# Patient Record
Sex: FEMALE | Race: WHITE
[De-identification: names, ages, dates, MRNs, and addresses within clinical notes are randomized per-mention and may not be internally consistent; named-entity substitution may affect disease eponyms.]

---

## 2017-10-20 ENCOUNTER — HOSPITAL ENCOUNTER (OUTPATIENT)
Dept: HOSPITAL 92 - DTY/OP | Age: 52
Discharge: HOME | End: 2017-10-20
Attending: SURGERY
Payer: COMMERCIAL

## 2017-10-20 ENCOUNTER — HOSPITAL ENCOUNTER (OUTPATIENT)
Dept: HOSPITAL 92 - RAD | Age: 52
Discharge: HOME | End: 2017-10-20
Attending: SURGERY
Payer: COMMERCIAL

## 2017-10-20 DIAGNOSIS — R19.2: ICD-10-CM

## 2017-10-20 DIAGNOSIS — E66.01: Primary | ICD-10-CM

## 2017-10-20 DIAGNOSIS — K31.89: ICD-10-CM

## 2017-10-20 DIAGNOSIS — K21.9: Primary | ICD-10-CM

## 2017-10-20 DIAGNOSIS — K44.9: ICD-10-CM

## 2017-10-20 DIAGNOSIS — E66.01: ICD-10-CM

## 2017-10-20 PROCEDURE — 97802 MEDICAL NUTRITION INDIV IN: CPT

## 2017-10-20 PROCEDURE — 74220 X-RAY XM ESOPHAGUS 1CNTRST: CPT

## 2017-10-20 NOTE — RAD
BARIUM SWALLOW ESOPHAGRAM:

 

HISTORY: 

Reflux.  K21.9.

 

COMPARISON: 

None.

 

TECHNIQUE: 

The patient was brought to the fluoroscopy suite.  All questions were answered.

 

Initially, the patient was given half a dose of gas-forming crystals.  The patient tolerated this we
ll.

 

Subsequently, thick liquid barium was given.  Primary and secondary peristalsis was performed.  Ther
e is reflux of the mid 1/3 esophagus.  Small sliding hiatal hernia.

 

Contrast transited through the GE junction without complication.

 

No diverticulum or extensive mass effect.

 

Next, the patient was put in the PANDA position.  Again, primary and secondary peristalsis was poor.  
The stomach was flipped up with organoaxial volvulus.

 

No evidence of obstruction.  The proximal small bowel appeared normal.

 

IMPRESSION: 

1.  Small sliding hiatal hernia with reflux to the mid 1/3 esophagus.

2.  Primary and secondary peristalsis, likely sequelae of primary reflux disease.

3.  13 mm barium tablet passed through the gastroesophageal junction without complication.

4.  Organoaxial volvulus of the stomach.

 

POS: [facility]

## 2017-12-04 ENCOUNTER — HOSPITAL ENCOUNTER (OUTPATIENT)
Dept: HOSPITAL 92 - LABBT | Age: 52
Discharge: HOME | End: 2017-12-04
Attending: SURGERY
Payer: COMMERCIAL

## 2017-12-04 DIAGNOSIS — E66.01: ICD-10-CM

## 2017-12-04 DIAGNOSIS — Z01.818: Primary | ICD-10-CM

## 2017-12-04 LAB
ALP SERPL-CCNC: 80 U/L (ref 40–150)
ALT SERPL W P-5'-P-CCNC: 16 U/L (ref 8–55)
ANION GAP SERPL CALC-SCNC: 11 MMOL/L (ref 10–20)
AST SERPL-CCNC: 23 U/L (ref 5–34)
BILIRUB DIRECT SERPL-MCNC: 0.3 MG/DL (ref 0.1–0.3)
BILIRUB SERPL-MCNC: 0.7 MG/DL (ref 0.2–1.2)
BUN SERPL-MCNC: 11 MG/DL (ref 9.8–20.1)
CALCIUM SERPL-MCNC: 9.4 MG/DL (ref 7.8–10.44)
CHLORIDE SERPL-SCNC: 105 MMOL/L (ref 98–107)
CO2 SERPL-SCNC: 26 MMOL/L (ref 22–29)
CREAT CL PREDICTED SERPL C-G-VRATE: 0 ML/MIN (ref 70–130)
GLOBULIN SER CALC-MCNC: 2.7 G/DL (ref 2.4–3.5)
HCT VFR BLD CALC: 38.8 % (ref 36–47)
RBC # BLD AUTO: 4.04 MILL/UL (ref 4.2–5.4)
WBC # BLD AUTO: 5.4 THOU/UL (ref 4.8–10.8)

## 2017-12-04 PROCEDURE — 93010 ELECTROCARDIOGRAM REPORT: CPT

## 2017-12-04 PROCEDURE — 71020: CPT

## 2017-12-04 PROCEDURE — 83036 HEMOGLOBIN GLYCOSYLATED A1C: CPT

## 2017-12-04 PROCEDURE — 80076 HEPATIC FUNCTION PANEL: CPT

## 2017-12-04 PROCEDURE — 93005 ELECTROCARDIOGRAM TRACING: CPT

## 2017-12-04 PROCEDURE — 85025 COMPLETE CBC W/AUTO DIFF WBC: CPT

## 2017-12-04 PROCEDURE — 80053 COMPREHEN METABOLIC PANEL: CPT

## 2017-12-04 NOTE — RAD
CHEST TWO VIEWS:

 

History: Pre-operative exam. 

 

Comparison: None. 

 

FINDINGS: 

Normal cardiac silhouette. The pulmonary vessels and hilum are normal. No masses or consolidation. No
 pneumothorax or osseous abnormalities. 

 

IMPRESSION: 

No acute cardiopulmonary process. 

 

POS: [facility]

## 2017-12-05 NOTE — EKG
Test Reason : 

Blood Pressure : ***/*** mmHG

Vent. Rate : 056 BPM     Atrial Rate : 056 BPM

   P-R Int : 146 ms          QRS Dur : 078 ms

    QT Int : 418 ms       P-R-T Axes : 004 013 041 degrees

   QTc Int : 403 ms

 

Sinus bradycardia

Otherwise normal ECG

No previous ECGs available

Confirmed by RAUL WEST ([id number]) on 12/5/2017 1:13:30 PM

 

Referred By:  ALAN           Confirmed By:RAUL WEST

## 2017-12-07 ENCOUNTER — HOSPITAL ENCOUNTER (INPATIENT)
Dept: HOSPITAL 92 - SDC | Age: 52
LOS: 1 days | Discharge: HOME | DRG: 909 | End: 2017-12-08
Attending: SURGERY | Admitting: SURGERY
Payer: COMMERCIAL

## 2017-12-07 VITALS — BODY MASS INDEX: 33.5 KG/M2

## 2017-12-07 DIAGNOSIS — R13.12: ICD-10-CM

## 2017-12-07 DIAGNOSIS — T85.628A: Primary | ICD-10-CM

## 2017-12-07 DIAGNOSIS — I10: ICD-10-CM

## 2017-12-07 DIAGNOSIS — K31.89: ICD-10-CM

## 2017-12-07 DIAGNOSIS — E66.01: ICD-10-CM

## 2017-12-07 PROCEDURE — 94760 N-INVAS EAR/PLS OXIMETRY 1: CPT

## 2017-12-07 PROCEDURE — 0DB64Z3 EXCISION OF STOMACH, PERCUTANEOUS ENDOSCOPIC APPROACH, VERTICAL: ICD-10-PCS | Performed by: SURGERY

## 2017-12-07 PROCEDURE — 88312 SPECIAL STAINS GROUP 1: CPT

## 2017-12-07 PROCEDURE — C9113 INJ PANTOPRAZOLE SODIUM, VIA: HCPCS

## 2017-12-07 PROCEDURE — 88307 TISSUE EXAM BY PATHOLOGIST: CPT

## 2017-12-07 PROCEDURE — 0DJ08ZZ INSPECTION OF UPPER INTESTINAL TRACT, VIA NATURAL OR ARTIFICIAL OPENING ENDOSCOPIC: ICD-10-PCS | Performed by: SURGERY

## 2017-12-07 PROCEDURE — 0DP64CZ REMOVAL OF EXTRALUMINAL DEVICE FROM STOMACH, PERCUTANEOUS ENDOSCOPIC APPROACH: ICD-10-PCS | Performed by: SURGERY

## 2017-12-07 PROCEDURE — 36415 COLL VENOUS BLD VENIPUNCTURE: CPT

## 2017-12-07 PROCEDURE — 85025 COMPLETE CBC W/AUTO DIFF WBC: CPT

## 2017-12-07 PROCEDURE — 74241: CPT

## 2017-12-07 PROCEDURE — 80048 BASIC METABOLIC PNL TOTAL CA: CPT

## 2017-12-07 RX ADMIN — POTASSIUM CHLORIDE, DEXTROSE MONOHYDRATE AND SODIUM CHLORIDE SCH MLS: 150; 5; 450 INJECTION, SOLUTION INTRAVENOUS at 20:24

## 2017-12-07 RX ADMIN — POTASSIUM CHLORIDE, DEXTROSE MONOHYDRATE AND SODIUM CHLORIDE SCH MLS: 150; 5; 450 INJECTION, SOLUTION INTRAVENOUS at 14:52

## 2017-12-07 RX ADMIN — Medication SCH GM: at 20:27

## 2017-12-07 NOTE — OP
PREOPERATIVE DIAGNOSIS:  Gastric torsion with slipped lap band.

 

SURGEON:  Drew Chan M.D.

 

PROCEDURE:  Laparoscopic removal of lap band and port with sleeve gastrectomy.

 

INDICATIONS:  This is a 52-year-old female who had a lap band that started having severe dysphagia de
spite an empty band swallow showed what appeared to be gastric torsion.

 

FINDINGS:  The fundus was torsed, but was able to be untorsed.  A 38-Danish bougie was used.

 

DESCRIPTION OF PROCEDURE:  After informed consent was obtained, the patient was taken to the operatin
g room and given general endotracheal anesthesia, placed in the supine position.  The abdomen was pre
pped and draped in the usual fashion.  Local anesthesia infiltrated subcutaneously and deep.  A 12 mm
 incision was performed approximately 8 inches below the xiphoid slightly to the left.  Veress needle
 inserted.  Drop test performed.  Pneumoperitoneum was created to a volume of 2 liters of carbon diox
vicente.  Utilizing a bladeless 12 mm trocar and 0 degree laparoscope, direct visual entry in the abdomin
al cavity was performed.  Pneumoperitoneum was then created to a pressure of 15 mmHg.  The patient wa
s placed in steep reverse Trendelenburg position.  Nathansen liver retractor inserted.  Left lobe of 
liver retracted superiorly.  Pylorus identified a 12 mm port placed on the right beneath it and two 1
2s placed left subcostal, one of which was where the port of the lap band was.  The lap band tubing w
as dissected out and then divided just band side of the connecting clip.  Then, this was traced back 
to the band and the band was dissected out sharply with Metzenbaum scissors.  The capsule was incised
 with the LigaSure circumferentially.  The buckle was unbuckled and the band taken from around the st
ach.  It was removed from the left upper quadrant incisions.  Then, the omentum was taken off the g
reater curvature utilizing the LigaSure 5 cm from the pylorus.  The short gastrics divided with LigaS
ure and the left crura defined with the LigaSure.  A 38-Danish bougie then inserted and directed into
 the antrum.  The linear 60 mm green load stapler used to divide the antrum to the bougie, gold load 
along the bougie, and a series of blues through the angle of His.  Intraoperative endoscopy was perfo
rmed.  The video endoscope inserted under direct vision and advanced into the sleeve.  The staple esme
e inspected.  There was no bleeding.  Staple line then tested by inflating the new stomach with press
urized air under water.  There was no air leak.  Stomach decompressed.  Scope removed.  The remnant s
tomach removed from the abdomen through the left upper port site.  The lap band port was dissected ou
t and removed.  The fascia closed with 0 Vicryl suture and the GraNee needle.  Trocars and retractors
 removed.  The skin closed with interrupted 4-0 Rapide.  Dermabond applied.  The patient tolerated th
e procedure well and transferred to recovery in good condition.  Sponge and needle count verified cor
rect x2.

## 2017-12-08 VITALS — TEMPERATURE: 98.4 F | DIASTOLIC BLOOD PRESSURE: 71 MMHG | SYSTOLIC BLOOD PRESSURE: 109 MMHG

## 2017-12-08 LAB
ANION GAP SERPL CALC-SCNC: 5 MMOL/L (ref 10–20)
BASOPHILS # BLD AUTO: 0 THOU/UL (ref 0–0.2)
BASOPHILS NFR BLD AUTO: 0.1 % (ref 0–1)
BUN SERPL-MCNC: 10 MG/DL (ref 9.8–20.1)
CALCIUM SERPL-MCNC: 8.8 MG/DL (ref 7.8–10.44)
CHLORIDE SERPL-SCNC: 108 MMOL/L (ref 98–107)
CO2 SERPL-SCNC: 28 MMOL/L (ref 22–29)
CREAT CL PREDICTED SERPL C-G-VRATE: 124 ML/MIN (ref 70–130)
EOSINOPHIL # BLD AUTO: 0 THOU/UL (ref 0–0.7)
EOSINOPHIL NFR BLD AUTO: 0.1 % (ref 0–10)
HCT VFR BLD CALC: 35 % (ref 36–47)
LYMPHOCYTES # BLD: 1.1 THOU/UL (ref 1.2–3.4)
LYMPHOCYTES NFR BLD AUTO: 15.9 % (ref 21–51)
MONOCYTES # BLD AUTO: 0.5 THOU/UL (ref 0.11–0.59)
MONOCYTES NFR BLD AUTO: 7 % (ref 0–10)
NEUTROPHILS # BLD AUTO: 5.1 THOU/UL (ref 1.4–6.5)
RBC # BLD AUTO: 3.62 MILL/UL (ref 4.2–5.4)
WBC # BLD AUTO: 6.6 THOU/UL (ref 4.8–10.8)

## 2017-12-08 RX ADMIN — POTASSIUM CHLORIDE, DEXTROSE MONOHYDRATE AND SODIUM CHLORIDE SCH: 150; 5; 450 INJECTION, SOLUTION INTRAVENOUS at 04:18

## 2017-12-08 RX ADMIN — Medication SCH GM: at 04:57

## 2017-12-08 RX ADMIN — POTASSIUM CHLORIDE, DEXTROSE MONOHYDRATE AND SODIUM CHLORIDE SCH: 150; 5; 450 INJECTION, SOLUTION INTRAVENOUS at 12:39

## 2017-12-08 NOTE — RAD
SINGLE CONTRAST UPPER GI:

 

Date:  12/08/17 

 

HISTORY:  

52-year-old female status post bariatric surgery evaluation. 

 

FLUOROSCOP TIME:

0.3 minutes. 

 

DOSE:

7.071 Gy*cm^2. 

 

TECHNIQUE/FINDINGS:

The patient was given 15 mL of Gastrografin orally in the upright position. Contrast media progressed
 through the esophagus and postoperative stomach. 

 

IMPRESSION: 

Unremarkable 15 mL Gastrografin swallow. 

 

 

POS: [facility]

## 2017-12-08 NOTE — DIS
DISCHARGE DIAGNOSIS:  Gastric volvulus, gastric outlet obstruction.

 

PROCEDURES DURING ADMISSION:  Laparoscopic removal of band and port with conversion to sleeve gastrec
chaz, EGD, postoperative Gastrografin swallow.

 

HOSPITAL COURSE:  The patient was admitted and taken to the operating room where she underwent detors
ion of her stomach with removal of band and port and sleeve gastrectomy with intraoperative esophagog
astroscopy.  Postoperatively, she did well.  Swallow was fine.  She was started on liquids.  She is t
olerating well.  Her pain is controlled on p.o. medications.  She is discharged home on hydrocodone a
nd Zofran.  She will follow up with me in 2 weeks.

## 2018-01-04 ENCOUNTER — HOSPITAL ENCOUNTER (EMERGENCY)
Dept: HOSPITAL 92 - ERS | Age: 53
Discharge: HOME | End: 2018-01-04
Payer: COMMERCIAL

## 2018-01-04 DIAGNOSIS — I10: ICD-10-CM

## 2018-01-04 DIAGNOSIS — K80.20: Primary | ICD-10-CM

## 2018-01-04 LAB
ALBUMIN SERPL BCG-MCNC: 4.1 G/DL (ref 3.5–5)
ALP SERPL-CCNC: 159 U/L (ref 40–150)
ALT SERPL W P-5'-P-CCNC: 87 U/L (ref 8–55)
ANION GAP SERPL CALC-SCNC: 14 MMOL/L (ref 10–20)
AST SERPL-CCNC: 92 U/L (ref 5–34)
BASOPHILS # BLD AUTO: 0.1 THOU/UL (ref 0–0.2)
BASOPHILS NFR BLD AUTO: 1.2 % (ref 0–1)
BILIRUB SERPL-MCNC: 0.6 MG/DL (ref 0.2–1.2)
BUN SERPL-MCNC: 9 MG/DL (ref 9.8–20.1)
CALCIUM SERPL-MCNC: 10.5 MG/DL (ref 7.8–10.44)
CHLORIDE SERPL-SCNC: 107 MMOL/L (ref 98–107)
CO2 SERPL-SCNC: 25 MMOL/L (ref 22–29)
CREAT CL PREDICTED SERPL C-G-VRATE: 0 ML/MIN (ref 70–130)
EOSINOPHIL # BLD AUTO: 0.2 THOU/UL (ref 0–0.7)
EOSINOPHIL NFR BLD AUTO: 3.4 % (ref 0–10)
GLOBULIN SER CALC-MCNC: 3 G/DL (ref 2.4–3.5)
GLUCOSE SERPL-MCNC: 89 MG/DL (ref 70–105)
HGB BLD-MCNC: 13.6 G/DL (ref 12–16)
LYMPHOCYTES # BLD: 2.2 THOU/UL (ref 1.2–3.4)
LYMPHOCYTES NFR BLD AUTO: 43.8 % (ref 21–51)
MCH RBC QN AUTO: 31.8 PG (ref 27–31)
MCV RBC AUTO: 96.9 FL (ref 81–99)
MONOCYTES # BLD AUTO: 0.3 THOU/UL (ref 0.11–0.59)
MONOCYTES NFR BLD AUTO: 6.7 % (ref 0–10)
NEUTROPHILS # BLD AUTO: 2.2 THOU/UL (ref 1.4–6.5)
NEUTROPHILS NFR BLD AUTO: 44.9 % (ref 42–75)
PLATELET # BLD AUTO: 165 THOU/UL (ref 130–400)
POTASSIUM SERPL-SCNC: 4.2 MMOL/L (ref 3.5–5.1)
RBC # BLD AUTO: 4.27 MILL/UL (ref 4.2–5.4)
SODIUM SERPL-SCNC: 142 MMOL/L (ref 136–145)
SP GR UR STRIP: 1.01 (ref 1–1.04)
WBC # BLD AUTO: 4.9 THOU/UL (ref 4.8–10.8)

## 2018-01-04 PROCEDURE — 80053 COMPREHEN METABOLIC PANEL: CPT

## 2018-01-04 PROCEDURE — 76705 ECHO EXAM OF ABDOMEN: CPT

## 2018-01-04 PROCEDURE — 83605 ASSAY OF LACTIC ACID: CPT

## 2018-01-04 PROCEDURE — 96361 HYDRATE IV INFUSION ADD-ON: CPT

## 2018-01-04 PROCEDURE — 36415 COLL VENOUS BLD VENIPUNCTURE: CPT

## 2018-01-04 PROCEDURE — 96375 TX/PRO/DX INJ NEW DRUG ADDON: CPT

## 2018-01-04 PROCEDURE — 85025 COMPLETE CBC W/AUTO DIFF WBC: CPT

## 2018-01-04 PROCEDURE — 74177 CT ABD & PELVIS W/CONTRAST: CPT

## 2018-01-04 PROCEDURE — 81003 URINALYSIS AUTO W/O SCOPE: CPT

## 2018-01-04 PROCEDURE — 96376 TX/PRO/DX INJ SAME DRUG ADON: CPT

## 2018-01-04 PROCEDURE — 96374 THER/PROPH/DIAG INJ IV PUSH: CPT

## 2018-02-20 NOTE — ULT
GALLBLADDER ULTRASOUND:

1/4/18

 

HISTORY: 

Abdominal pain. Left upper quadrant to back pain. Nausea and vomiting. 

 

COMPARISON:  

None.

 

TECHNIQUE:  

Utilizing multihertz transducer, sonographic imaging of the right upper quadrant is performed in the 
longitudinal and transverse plane. 

 

FINDINGS:  

The pancreas is obscured by bowel gas. Hepatic parenchyma has a normal echotexture. No hepatic masses
 or intrahepatic biliary dilatation. The contour of the hepatic margin is maintained. Right hepatic l
obe measures 13.7 cm. 

 

Gallbladder appears to be contacted. Questionable cholelithiasis without definite gallbladder wall th
ickening or pericholecystic fluid. Negative Brennan's sign. Common bile duct diameter is difficult to 
appreciate. 

 

Main portal vein is patent. Appropriate directional flow.

 

There is right renal cortical thinning. No hydronephrosis. Right kidney measures 4.7 x 9.5 x 4.3 cm. 


 

IMPRESSION:  

Limited evaluation due to bowel gas. Contracted gallbladder. There is sonographic evidence of choleli
thiasis without definite sonographic evidence of cholecystitis. 

 

 

 

POS: [facility]

## 2020-08-27 ENCOUNTER — HOSPITAL ENCOUNTER (OUTPATIENT)
Dept: HOSPITAL 92 - LABBT | Age: 55
Discharge: HOME | End: 2020-08-27
Attending: SURGERY
Payer: COMMERCIAL

## 2020-08-27 DIAGNOSIS — Z20.828: ICD-10-CM

## 2020-08-27 DIAGNOSIS — Z01.818: Primary | ICD-10-CM

## 2020-08-27 DIAGNOSIS — K80.20: ICD-10-CM

## 2020-08-27 LAB
ALBUMIN SERPL BCG-MCNC: 4.1 G/DL (ref 3.5–5)
ALP SERPL-CCNC: 79 U/L (ref 40–110)
ALT SERPL W P-5'-P-CCNC: 8 U/L (ref 8–55)
ANION GAP SERPL CALC-SCNC: 9 MMOL/L (ref 10–20)
AST SERPL-CCNC: 18 U/L (ref 5–34)
BASOPHILS # BLD AUTO: 0.1 THOU/UL (ref 0–0.2)
BASOPHILS NFR BLD AUTO: 1.2 % (ref 0–1)
BILIRUB DIRECT SERPL-MCNC: 0.2 MG/DL (ref 0.1–0.3)
BILIRUB SERPL-MCNC: 0.4 MG/DL (ref 0.2–1.2)
BUN SERPL-MCNC: 11 MG/DL (ref 9.8–20.1)
CALCIUM SERPL-MCNC: 9 MG/DL (ref 7.8–10.44)
CHLORIDE SERPL-SCNC: 108 MMOL/L (ref 98–107)
CO2 SERPL-SCNC: 29 MMOL/L (ref 22–29)
CREAT CL PREDICTED SERPL C-G-VRATE: 0 ML/MIN (ref 70–130)
EOSINOPHIL # BLD AUTO: 0.1 THOU/UL (ref 0–0.7)
EOSINOPHIL NFR BLD AUTO: 2.2 % (ref 0–10)
GLUCOSE SERPL-MCNC: 81 MG/DL (ref 70–105)
HGB BLD-MCNC: 12.3 G/DL (ref 12–16)
LYMPHOCYTES # BLD: 2.2 THOU/UL (ref 1.2–3.4)
LYMPHOCYTES NFR BLD AUTO: 43.3 % (ref 21–51)
MCH RBC QN AUTO: 30.6 PG (ref 27–31)
MCV RBC AUTO: 95.1 FL (ref 78–98)
MONOCYTES # BLD AUTO: 0.4 THOU/UL (ref 0.11–0.59)
MONOCYTES NFR BLD AUTO: 7.5 % (ref 0–10)
NEUTROPHILS # BLD AUTO: 2.3 THOU/UL (ref 1.4–6.5)
NEUTROPHILS NFR BLD AUTO: 45.9 % (ref 42–75)
PLATELET # BLD AUTO: 176 THOU/UL (ref 130–400)
POTASSIUM SERPL-SCNC: 4.3 MMOL/L (ref 3.5–5.1)
RBC # BLD AUTO: 4.03 MILL/UL (ref 4.2–5.4)
SODIUM SERPL-SCNC: 142 MMOL/L (ref 136–145)
WBC # BLD AUTO: 5 THOU/UL (ref 4.8–10.8)

## 2020-08-27 PROCEDURE — 80076 HEPATIC FUNCTION PANEL: CPT

## 2020-08-27 PROCEDURE — 80048 BASIC METABOLIC PNL TOTAL CA: CPT

## 2020-08-27 PROCEDURE — 93010 ELECTROCARDIOGRAM REPORT: CPT

## 2020-08-27 PROCEDURE — U0003 INFECTIOUS AGENT DETECTION BY NUCLEIC ACID (DNA OR RNA); SEVERE ACUTE RESPIRATORY SYNDROME CORONAVIRUS 2 (SARS-COV-2) (CORONAVIRUS DISEASE [COVID-19]), AMPLIFIED PROBE TECHNIQUE, MAKING USE OF HIGH THROUGHPUT TECHNOLOGIES AS DESCRIBED BY CMS-2020-01-R: HCPCS

## 2020-08-27 PROCEDURE — 87635 SARS-COV-2 COVID-19 AMP PRB: CPT

## 2020-08-27 PROCEDURE — 85025 COMPLETE CBC W/AUTO DIFF WBC: CPT

## 2020-08-27 PROCEDURE — 93005 ELECTROCARDIOGRAM TRACING: CPT

## 2020-08-31 ENCOUNTER — HOSPITAL ENCOUNTER (INPATIENT)
Dept: HOSPITAL 92 - SDC | Age: 55
LOS: 2 days | Discharge: HOME | DRG: 418 | End: 2020-09-02
Attending: SURGERY | Admitting: SURGERY
Payer: COMMERCIAL

## 2020-08-31 VITALS — BODY MASS INDEX: 27.4 KG/M2

## 2020-08-31 DIAGNOSIS — K83.8: ICD-10-CM

## 2020-08-31 DIAGNOSIS — R93.2: ICD-10-CM

## 2020-08-31 DIAGNOSIS — R01.1: ICD-10-CM

## 2020-08-31 DIAGNOSIS — E87.5: ICD-10-CM
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
## 2020-08-31 DIAGNOSIS — Z98.51: ICD-10-CM

## 2020-08-31 DIAGNOSIS — K80.70: Primary | ICD-10-CM

## 2020-08-31 DIAGNOSIS — I10: ICD-10-CM

## 2020-08-31 DIAGNOSIS — E66.9: ICD-10-CM

## 2020-08-31 DIAGNOSIS — I97.191: ICD-10-CM

## 2020-08-31 DIAGNOSIS — R74.0: ICD-10-CM

## 2020-08-31 DIAGNOSIS — Z98.84: ICD-10-CM

## 2020-08-31 DIAGNOSIS — D64.9: ICD-10-CM

## 2020-08-31 LAB
ALBUMIN SERPL BCG-MCNC: 3.9 G/DL (ref 3.5–5)
ALP SERPL-CCNC: 97 U/L (ref 40–110)
ALT SERPL W P-5'-P-CCNC: 112 U/L (ref 8–55)
ANION GAP SERPL CALC-SCNC: 11 MMOL/L (ref 10–20)
AST SERPL-CCNC: 232 U/L (ref 5–34)
BASOPHILS # BLD AUTO: 0.1 THOU/UL (ref 0–0.2)
BASOPHILS NFR BLD AUTO: 1.1 % (ref 0–1)
BILIRUB SERPL-MCNC: 1 MG/DL (ref 0.2–1.2)
BUN SERPL-MCNC: 11 MG/DL (ref 9.8–20.1)
CALCIUM SERPL-MCNC: 9 MG/DL (ref 7.8–10.44)
CHLORIDE SERPL-SCNC: 105 MMOL/L (ref 98–107)
CO2 SERPL-SCNC: 26 MMOL/L (ref 22–29)
CREAT CL PREDICTED SERPL C-G-VRATE: 90 ML/MIN (ref 70–130)
EOSINOPHIL # BLD AUTO: 0 THOU/UL (ref 0–0.7)
EOSINOPHIL NFR BLD AUTO: 0.1 % (ref 0–10)
GLOBULIN SER CALC-MCNC: 2.5 G/DL (ref 2.4–3.5)
GLUCOSE SERPL-MCNC: 171 MG/DL (ref 70–105)
HGB BLD-MCNC: 12.9 G/DL (ref 12–16)
LYMPHOCYTES # BLD: 0.4 THOU/UL (ref 1.2–3.4)
LYMPHOCYTES NFR BLD AUTO: 5 % (ref 21–51)
MAGNESIUM SERPL-MCNC: 1.9 MG/DL (ref 1.6–2.6)
MCH RBC QN AUTO: 31.4 PG (ref 27–31)
MCV RBC AUTO: 94.4 FL (ref 78–98)
MONOCYTES # BLD AUTO: 0.1 THOU/UL (ref 0.11–0.59)
MONOCYTES NFR BLD AUTO: 1.4 % (ref 0–10)
NEUTROPHILS # BLD AUTO: 6.9 THOU/UL (ref 1.4–6.5)
NEUTROPHILS NFR BLD AUTO: 92.4 % (ref 42–75)
PLATELET # BLD AUTO: 142 THOU/UL (ref 130–400)
POTASSIUM SERPL-SCNC: 4.9 MMOL/L (ref 3.5–5.1)
RBC # BLD AUTO: 4.11 MILL/UL (ref 4.2–5.4)
SODIUM SERPL-SCNC: 137 MMOL/L (ref 136–145)
TROPONIN I SERPL DL<=0.01 NG/ML-MCNC: (no result) NG/ML (ref ?–0.03)
WBC # BLD AUTO: 7.4 THOU/UL (ref 4.8–10.8)

## 2020-08-31 PROCEDURE — 84443 ASSAY THYROID STIM HORMONE: CPT

## 2020-08-31 PROCEDURE — 85027 COMPLETE CBC AUTOMATED: CPT

## 2020-08-31 PROCEDURE — 0FT44ZZ RESECTION OF GALLBLADDER, PERCUTANEOUS ENDOSCOPIC APPROACH: ICD-10-PCS | Performed by: SURGERY

## 2020-08-31 PROCEDURE — 88304 TISSUE EXAM BY PATHOLOGIST: CPT

## 2020-08-31 PROCEDURE — 93010 ELECTROCARDIOGRAM REPORT: CPT

## 2020-08-31 PROCEDURE — 93306 TTE W/DOPPLER COMPLETE: CPT

## 2020-08-31 PROCEDURE — S0028 INJECTION, FAMOTIDINE, 20 MG: HCPCS

## 2020-08-31 PROCEDURE — BF13YZZ FLUOROSCOPY OF GALLBLADDER AND BILE DUCTS USING OTHER CONTRAST: ICD-10-PCS | Performed by: SURGERY

## 2020-08-31 PROCEDURE — 47532 INJECTION FOR CHOLANGIOGRAM: CPT

## 2020-08-31 PROCEDURE — 71045 X-RAY EXAM CHEST 1 VIEW: CPT

## 2020-08-31 PROCEDURE — 85025 COMPLETE CBC W/AUTO DIFF WBC: CPT

## 2020-08-31 PROCEDURE — 84484 ASSAY OF TROPONIN QUANT: CPT

## 2020-08-31 PROCEDURE — 93005 ELECTROCARDIOGRAM TRACING: CPT

## 2020-08-31 PROCEDURE — 83735 ASSAY OF MAGNESIUM: CPT

## 2020-08-31 PROCEDURE — 36415 COLL VENOUS BLD VENIPUNCTURE: CPT

## 2020-08-31 PROCEDURE — 74018 RADEX ABDOMEN 1 VIEW: CPT

## 2020-08-31 PROCEDURE — 83690 ASSAY OF LIPASE: CPT

## 2020-08-31 PROCEDURE — S0020 INJECTION, BUPIVICAINE HYDRO: HCPCS

## 2020-08-31 PROCEDURE — 36416 COLLJ CAPILLARY BLOOD SPEC: CPT

## 2020-08-31 PROCEDURE — 80053 COMPREHEN METABOLIC PANEL: CPT

## 2020-08-31 PROCEDURE — 74330 X-RAY BILE/PANC ENDOSCOPY: CPT

## 2020-08-31 RX ADMIN — POTASSIUM CHLORIDE, DEXTROSE MONOHYDRATE AND SODIUM CHLORIDE SCH: 150; 5; 450 INJECTION, SOLUTION INTRAVENOUS at 20:52

## 2020-08-31 RX ADMIN — FAMOTIDINE SCH MG: 10 INJECTION, SOLUTION INTRAVENOUS at 20:48

## 2020-08-31 RX ADMIN — POTASSIUM CHLORIDE, DEXTROSE MONOHYDRATE AND SODIUM CHLORIDE SCH MLS: 150; 5; 450 INJECTION, SOLUTION INTRAVENOUS at 15:00

## 2020-08-31 RX ADMIN — CEFOXITIN SODIUM SCH MLS: 2 INJECTION, SOLUTION INTRAVENOUS at 18:08

## 2020-08-31 NOTE — RAD
Exam: 1 view abdomen



HISTORY: Abdominal pain



FINDINGS: Nonspecific bowel gas pattern. Surgical clips in left upper quadrant. No suspicious densiti
es in the abdomen. There may be free air in the abdomen, presumed to be iatrogenic.



IMPRESSION: Nonspecific bowel gas pattern. Possible iatrogenic free air.



Reported By: Ermelinda Gupta 

Electronically Signed:  8/31/2020 8:04 PM

## 2020-08-31 NOTE — OP
DATE OF PROCEDURE:  08/31/2020



PREOPERATIVE DIAGNOSIS:  Symptomatic cholelithiasis with dilated common duct.



PROCEDURE PERFORMED:  Laparoscopic cholecystectomy with cholangiogram.



INDICATIONS:  This is a 55-year-old female who has been having episodic right upper

quadrant pain, radiating to back, associated with nausea.  Ultrasound showed

thickened gallbladder wall with a dilated common duct. 



FINDINGS:  There was quite a bit of scar and inflammation, the transverse colon was

stuck to the gallbladder.  An intraoperative cholangiogram showed a filling defect

in the distal common bile duct, no flow into the duodenum, and dilated proximal

duct. 



DESCRIPTION OF PROCEDURE:  After informed consent was obtained, the patient was

taken to the operating room, given general endotracheal anesthesia, and placed in

the supine position.  Abdomen was prepped and draped in the usual fashion.  Local

anesthesia was infiltrated subcutaneously and deep.  A subumbilical incision was

performed.  Subcu divided sharply.  The fascia was grasped and 2 stay sutures of 0

Vicryl placed through each side of midline.  Midline incised.  Digital palpation

revealed no local adhesions.  A blunt 12-mm trocar inserted.  Pneumoperitoneum was

created to a pressure of 15 mmHg.  A 0-degree laparoscope inserted under direct

vision.  Three 5-mm ports were placed subcostally.  The gallbladder was not

immediately visualized, the transverse  colon was covering and stuck to it.    I was

able to reflect that back to find the gallbladder, advanced it, and then using blunt

and sharp dissection to take the gallbladder off the colon and then the peritoneum

opened distally to expose the cystic artery and cystic duct in a critical view.

Clip in place at the base of the gallbladder.  An incision made in the cystic duct.

Intraoperative cholangiogram was performed utilizing fluoroscopy.  This showed a

dilated common bile duct.  There was a filling defect distally and no flow into the

duodenum.  The catheter was removed.  The duct triply ligated and divided.  The

artery triply ligated and divided.  The gallbladder removed from its fossa utilizing

electrocautery, placed in an Endosac, removed from the abdomen in an 

Endosac.  Hemostasis was assured.  Trocars and retractors removed.  The fascia

closed with interrupted 2-0 Vicryl suture.  The skin closed with interrupted 4-0

Rapide. 

Dermabond applied.  The patient tolerated the procedure well, transferred to

Recovery in good condition.  Sponge and needle count verified correct x2. 







Job ID:  [id number]

## 2020-08-31 NOTE — PDOC.EVN
Event Note





- Event Note


Event Note: 





Code green: Patient 10/10 abdominal pain, radiating midsternum with associated 

SOB s/p lap luis. Stable VS. afebrile. Abdomen soft, tender to palpation. EKG 

NSR, no ST elevations, no cardiac risk factors. S/p procedure difficulty 

managing pain, anesthesia changed from morphine IVP prn to fentanyl PCA. 

Ordered trop, CXR, KUB, and consult anesthesia for pain control. Dr. Steele 

attended code green and agrees with plan of care.

## 2020-08-31 NOTE — RAD
Exam: Chest one view



HISTORY:Chest pain. Status post laparoscopic cholecystectomy.



Comparison: None



FINDINGS:

Cardiac silhouette: Normal

Aorta: Unremarkable

Pulmonary vessels: Normal

Costophrenic angles: Clear



LUNGS: No masses or consolidation.



Pneumothorax: None



Osseous abnormalities: None



IMPRESSION: No acute cardiopulmonary process.



Reported By: Ermelinda Gupta 

Electronically Signed:  8/31/2020 8:03 PM

## 2020-08-31 NOTE — RAD
EXAM: Cholangiogram in surgery



HISTORY: Cholelithiasis



COMPARISON: None



FINDINGS:

Limited intraoperative fluoroscopic views were taken during a cholangiogram in surgery.

The common bile duct is enlarged in caliber with a questionable filling defect in the distal common b
ile duct.

No leakage from the common bile duct.

Contrast is not seen passing into the duodenum.

No abnormality of the intrahepatic bile ducts.



IMPRESSION: Possible distal common bile duct filling defect with lack of passage of the contrast into
 the duodenum



Reported By: Siddhartha Robles 

Electronically Signed:  8/31/2020 4:26 PM

## 2020-08-31 NOTE — PDOC.HHP
Hospitalist HPI





- History of Present Illness


Symptomatic cholelithiasis with dilated common bile duct


History of Present Illness: 





PCP: None





The patient is a 55-year-old female with no significant past medical history 

presents to the hospital for scheduled laparoscopic cholecystectomy with 

cholangiogram with Dr. Chan.  The patient reports several months of right 

upper quadrant abdominal pain with associated nausea.  She described the 

abdominal pain is intermittently sharp, radiating to her right shoulder 

exacerbated by oral intake, relieved by nothing.  During the procedure, the 

patient had sinus bradycardia on her EKG.  The nurse reported that when she 

came up to the floor status post surgery, her temperature was 97.1 and she was 

shaking.  After warming the patient, her heart rate improved.  Patient denies 

any chest pain, heart palpitations, shortness of breath.  She denies feeling 

lightheaded or having nausea.  Reviewing her preop EKG, she was sinus 

bradycardia, 59 bpm with normal SD interval, QRS, QT interval.  Her CMP and CBC 

were unremarkable preop.


ED Course: 





Direct admit.





Hospitalist ROS





- Review of Systems


Constitutional: denies: fever, chills


Respiratory: denies: cough, shortness of breath, SOB with excertion


Cardiovascular: denies: chest pain, palpitations, edema, light headedness


Genitourinary: denies: dysuria


Neurological: denies: weakness, confusion


All other systems reviewed; all pertinent +/- noted in HPI/Subj





- Medication


Medications: 


Active Medications











Generic Name Dose Route Start Last Admin





  Trade Name Freq  PRN Reason Stop Dose Admin


 


Al Hydroxide/Mg Hydroxide  15 ml  08/31/20 11:52  08/31/20 15:26





  Maalox  PO   15 ml





  Q6H PRN   Administration





  Dyspepsia   





     





     





     


 


Potassium Chloride/Dextrose/Sod Cl  1,000 mls @ 120 mls/hr  08/31/20 12:00  08/ 31/20 15:00





  D5 1/2 Ns W/20 Meq Kcl  IV   1,000 mls





  .Q8H20M ENRIQUETA   Administration





     





     





     





     


 


Fentanyl Citrate 2,000 mcg/  100 mls @ 0 mls/hr  08/31/20 16:29  08/31/20 17:14





  Sodium Chloride  IV   100 mls





  INF PRN   Administration





  Pain   





     





     





  As Directed   


 


Ketorolac Tromethamine  30 mg  08/31/20 18:00  08/31/20 17:05





  Toradol  IVP  09/02/20 18:01  30 mg





  Q6HR ENRIQUETA   Administration





     





     





     





     








Home medications: None





Allergies: NKDA





Hospitalist History





- Past Medical History


Source: patient, family (), RN notes reviewed


Other Medical History: 





PAST MEDICAL HISTORY:


None





PAST SURGICAL HISTORY:


Gastric sleeve 12/17


LAP-BAND





SOCIAL HISTORY:


Never a smoker, denies any illicit drug use, alcohol socially.  Lives with her 

 at home.  Ambulates without any assist devices.





FAMILY HISTORY:


Noncontributory for cardiac disease, pulmonary disease





- Exam


General Appearance: awake alert


General - other findings: Uncomfortable, nontoxic in appearance


Eye: anicteric sclera


ENT: normocephalic atraumatic


Neck: supple, symmetric


Heart: RRR, no gallops, no rubs, normal peripheral pulses, II/IV


Respiratory: CTAB, no wheezes, no rales, no ronchi, normal chest expansion, no 

tachypnea


Gastrointestinal: soft, no guarding, no rigidity


Gastrointestinal - other findings: Hypoactive bowel sounds


Extremities: no cyanosis, no edema


Skin: no rashes


Neurological: normal sensation to touch, no weakness, no focal deficits


Psychiatric: normal affect, A&O x 3





Hospitalist Results





- Labs


Additional comment: 


Preop labs:


Sodium 142


Potassium 4.3


BUN 11


Creatinine 0.82


Calcium 9


Glucose 81





Hemoglobin 12.3, hematocrit 38.3, WBCs 5,platelets 176





- EKG Interpretation


EKG: 





Normal sinus rhythm, 59 bpm, no ST elevations.





Hospitalist H&P A/P





- Problem


(1) Bradycardia following surgery


Code(s): I97.89 - OTH POSTPROC COMP AND DISORDERS OF THE CIRC SYS, NEC   Status

: Acute   


Assessment and Plan: 


Patient is admitted to the surgical floor, inpatient status.  Expected length 

of stay greater than 2 midnights.  We were consulted for asymptomatic 

bradycardia.  Patient was bradycardic during a lap cholecystectomy with 

cholangiogram with reported sinus bradycardia, 49 bpm and temperature 97.1.  

With improvement to temperature, heart rate improved to the 60s.  Patient was 

asymptomatic, no chest pain, shortness of breath, or palpitations.  Review of 

patient's preop EKG showed sinus bradycardia, 59 bpm.  Preop labs were 

unremarkable, including electrolytes.  She has no cardiovascular risk factors.  

Will repeat CMP, CBC and check a TSH and mag level.  Will order echocardiogram.











(2) Heart murmur


Code(s): R01.1 - CARDIAC MURMUR, UNSPECIFIED   Status: Chronic   


Assessment and Plan: 


Patient reports history of known murmur.  Upon exam, patient had a 2/6 murmur.








- Plan


Plan: 





SCDs for DVT prophylaxis.


Pepcid for GI prophylaxis.


Full code.


Designated medical decision-maker is her , Cristopher at [phone]


Discussed case with Dr. Steele.

## 2020-09-01 LAB
ALBUMIN SERPL BCG-MCNC: 3.5 G/DL (ref 3.5–5)
ALBUMIN SERPL BCG-MCNC: 3.6 G/DL (ref 3.5–5)
ALP SERPL-CCNC: 111 U/L (ref 40–110)
ALP SERPL-CCNC: 132 U/L (ref 40–110)
ALT SERPL W P-5'-P-CCNC: 254 U/L (ref 8–55)
ALT SERPL W P-5'-P-CCNC: 257 U/L (ref 8–55)
ANION GAP SERPL CALC-SCNC: 11 MMOL/L (ref 10–20)
ANION GAP SERPL CALC-SCNC: 11 MMOL/L (ref 10–20)
AST SERPL-CCNC: 249 U/L (ref 5–34)
AST SERPL-CCNC: 362 U/L (ref 5–34)
BASOPHILS # BLD AUTO: 0 THOU/UL (ref 0–0.2)
BASOPHILS NFR BLD AUTO: 0.4 % (ref 0–1)
BILIRUB SERPL-MCNC: 1.6 MG/DL (ref 0.2–1.2)
BILIRUB SERPL-MCNC: 2.7 MG/DL (ref 0.2–1.2)
BUN SERPL-MCNC: 6 MG/DL (ref 9.8–20.1)
BUN SERPL-MCNC: 8 MG/DL (ref 9.8–20.1)
CALCIUM SERPL-MCNC: 8.9 MG/DL (ref 7.8–10.44)
CALCIUM SERPL-MCNC: 9 MG/DL (ref 7.8–10.44)
CHLORIDE SERPL-SCNC: 105 MMOL/L (ref 98–107)
CHLORIDE SERPL-SCNC: 107 MMOL/L (ref 98–107)
CO2 SERPL-SCNC: 24 MMOL/L (ref 22–29)
CO2 SERPL-SCNC: 26 MMOL/L (ref 22–29)
CREAT CL PREDICTED SERPL C-G-VRATE: 91 ML/MIN (ref 70–130)
CREAT CL PREDICTED SERPL C-G-VRATE: 91 ML/MIN (ref 70–130)
EOSINOPHIL # BLD AUTO: 0 THOU/UL (ref 0–0.7)
EOSINOPHIL NFR BLD AUTO: 0.1 % (ref 0–10)
GLOBULIN SER CALC-MCNC: 2.5 G/DL (ref 2.4–3.5)
GLOBULIN SER CALC-MCNC: 2.6 G/DL (ref 2.4–3.5)
GLUCOSE SERPL-MCNC: 139 MG/DL (ref 70–105)
GLUCOSE SERPL-MCNC: 165 MG/DL (ref 70–105)
HGB BLD-MCNC: 11.8 G/DL (ref 12–16)
LIPASE SERPL-CCNC: 28 U/L (ref 8–78)
LYMPHOCYTES # BLD: 1 THOU/UL (ref 1.2–3.4)
LYMPHOCYTES NFR BLD AUTO: 11.1 % (ref 21–51)
MCH RBC QN AUTO: 30.8 PG (ref 27–31)
MCV RBC AUTO: 95.2 FL (ref 78–98)
MONOCYTES # BLD AUTO: 0.9 THOU/UL (ref 0.11–0.59)
MONOCYTES NFR BLD AUTO: 9.8 % (ref 0–10)
NEUTROPHILS # BLD AUTO: 6.9 THOU/UL (ref 1.4–6.5)
NEUTROPHILS NFR BLD AUTO: 78.7 % (ref 42–75)
PLATELET # BLD AUTO: 139 THOU/UL (ref 130–400)
POTASSIUM SERPL-SCNC: 5.2 MMOL/L (ref 3.5–5.1)
POTASSIUM SERPL-SCNC: 5.2 MMOL/L (ref 3.5–5.1)
RBC # BLD AUTO: 3.84 MILL/UL (ref 4.2–5.4)
SODIUM SERPL-SCNC: 135 MMOL/L (ref 136–145)
SODIUM SERPL-SCNC: 139 MMOL/L (ref 136–145)
WBC # BLD AUTO: 8.7 THOU/UL (ref 4.8–10.8)

## 2020-09-01 PROCEDURE — 0F798ZZ DILATION OF COMMON BILE DUCT, VIA NATURAL OR ARTIFICIAL OPENING ENDOSCOPIC: ICD-10-PCS | Performed by: INTERNAL MEDICINE

## 2020-09-01 RX ADMIN — CEFOXITIN SODIUM SCH MLS: 2 INJECTION, SOLUTION INTRAVENOUS at 00:35

## 2020-09-01 RX ADMIN — FAMOTIDINE SCH: 10 INJECTION, SOLUTION INTRAVENOUS at 10:27

## 2020-09-01 RX ADMIN — POTASSIUM CHLORIDE, DEXTROSE MONOHYDRATE AND SODIUM CHLORIDE SCH MLS: 150; 5; 450 INJECTION, SOLUTION INTRAVENOUS at 00:37

## 2020-09-01 RX ADMIN — POTASSIUM CHLORIDE, DEXTROSE MONOHYDRATE AND SODIUM CHLORIDE SCH: 150; 5; 450 INJECTION, SOLUTION INTRAVENOUS at 12:31

## 2020-09-01 RX ADMIN — POTASSIUM CHLORIDE, DEXTROSE MONOHYDRATE AND SODIUM CHLORIDE SCH: 150; 5; 450 INJECTION, SOLUTION INTRAVENOUS at 06:44

## 2020-09-01 RX ADMIN — CEFOXITIN SODIUM SCH MLS: 2 INJECTION, SOLUTION INTRAVENOUS at 17:53

## 2020-09-01 RX ADMIN — FAMOTIDINE SCH: 10 INJECTION, SOLUTION INTRAVENOUS at 20:46

## 2020-09-01 RX ADMIN — CEFOXITIN SODIUM SCH: 2 INJECTION, SOLUTION INTRAVENOUS at 10:26

## 2020-09-01 NOTE — RAD
EXAM: ERCP



HISTORY: Stone removal and stent placement



COMPARISON: Cholangiogram 8/31/2020



FINDINGS:

Limited intraoperative fluoroscopic views were taken during a an ERCP.

The previously seen filling defect in the common bile duct is not visualized on this exam.

The common bile duct is slightly increased in caliber without filling defect.

No leakage from the common bile duct.

No abnormality of the intrahepatic bile ducts.

Contrast is seen passing into the duodenum.



IMPRESSION: Removal of common bile duct stone



Reported By: Siddhartha Robles 

Electronically Signed:  9/1/2020 12:50 PM

## 2020-09-01 NOTE — PRG
DATE OF SERVICE:  09/01/2020



SUBJECTIVE:  The patient had a very rough night with extreme pain in the right upper

quadrant and back, some nausea. 



OBJECTIVE:  VITAL SIGNS:  Her temperature is 97.7, pulse is 47, blood pressure is

131/78.  She is awake, alert. 



LABORATORY DATA:  Her bilirubin is 1.6, AST of 362, ALT of 257.  The intraoperative

cholangiogram showed a filling defect consistent with choledocholithiasis. 



ASSESSMENT:  Choledocholithiasis.



PLAN:  She is scheduled for ERCP today.







Job ID:  [id number]

## 2020-09-01 NOTE — CON
DATE OF CONSULTATION:  



HISTORY OF PRESENT ILLNESS:  The patient is a 55-year-old woman, who presented 
after undergoing surgery was noted to have marked bradycardia.  The patient has 
no

previous cardiac history.  She states for the past six months, she has had 
dizziness. She states rarely she feels weak.  She has never lost consciousness 
or fallen.  The

patient denies having any chest discomfort or dyspnea.  The patient has no 
known cardiac risk factors. 



PAST MEDICAL HISTORY:  None.



PAST SURGICAL HISTORY:  Lap-band surgery, stomach surgery, and cholecystectomy. 



SOCIAL HISTORY:  Nonsmoker.



FAMILY HISTORY:  No strong family history of heart disease.



ALLERGIES:  NO KNOWN DRUG ALLERGIES.



PHYSICAL EXAMINATION:

GENERAL:  This is a well-developed woman, in no acute distress. 

VITAL SIGNS:  Blood pressure 131/78, heart rate is 50. 

NECK:  No jugular venous distention. 

LUNGS:  Clear to auscultation. 

HEART:  Regular rate and rhythm.  Normal S1 and S2.  2/6 systolic murmur. 

ABDOMEN:  Nondistended. 

EXTREMITIES:  Showed trace edema. 

VASCULAR:  Radial pulses 2+.



LABORATORY DATA:  Sodium 135, potassium 5.2, chloride 105, bicarbonate 24

creatinine 0.73, glucose 165. White blood cell count is 8.7, hemoglobin 11.8,

hematocrit 36.6, and platelets 139.   EKG  bradycardia otherwise normal ECG. 



IMPRESSION:  

1. Bradycardia.

2. Status post cholecystectomy. 



This patient presents after undergoing a cholecystectomy.  She was found to 
have marked bradycardia.  Her TSH level is normal.  The patient does have some 
symptoms

of dizziness.  We will monitor the patient on telemetry.  We will check the 
patient's echocardiogram.  We will follow this patient with you through her

hospitalization.  We would avoid as much as possible  medications that will 
slow her heart rate including pain medications.







Job ID:  [id number]



[facility]

## 2020-09-01 NOTE — PDOC.HOSPP
- Subjective


Encounter Date: 09/01/20


Encounter Time: 10:30


Subjective: 











 The patient reported persistent dizziness upon evaluation this am. She states 

that she has been dizzy for months and weak while walking for months. She has 

had a low heart rate before 





She states her last meal was on Sunday. No nausea or vomiting. She still has 

abdominal pain and is waiting for an ERCP 








Pt reports history of murmur, but has not had an ECHO done yet








- Objective


Vital Signs & Weight: 


 Vital Signs (12 hours)











  Temp Pulse Resp BP Pulse Ox


 


 09/01/20 08:00  97.7 F  45 L  14  131/78  97








 Weight











Weight                         150 lb














I&O: 


 











 08/31/20 09/01/20 09/02/20





 06:59 06:59 06:59


 


Intake Total  840 


 


Balance  840 











Result Diagrams: 


 09/01/20 04:46





 09/01/20 04:46


Additional Labs: 


 Accuchecks











  08/31/20





  19:10


 


POC Glucose  160 H














Hospitalist ROS





- Review of Systems


Constitutional: denies: fever, chills





- Medication


Medications: 


Active Medications











Generic Name Dose Route Start Last Admin





  Trade Name Freq  PRN Reason Stop Dose Admin


 


Al Hydroxide/Mg Hydroxide  15 ml  08/31/20 11:52  08/31/20 15:26





  Maalox  PO   15 ml





  Q6H PRN   Administration





  Dyspepsia   





     





     





     


 


Famotidine  20 mg  08/31/20 21:00  09/01/20 10:26





  Pepcid  PO   Not Given





  Q12HR ENRIQUETA   





     





     





     





     


 


Famotidine  20 mg  08/31/20 21:00  09/01/20 10:27





  Pepcid  SLOW IVP   Not Given





  Q12HR [facility]   





     





     





     





     


 


Cefoxitin Sodium/Dextrose 2 gm  50 mls @ 100 mls/hr  08/31/20 17:00  09/01/20 10

:26





  / Device  IVPB   Not Given





  0100,0900,1700 [facility]   





     





     





     





     


 


Fentanyl Citrate 2,000 mcg/  100 mls @ 0 mls/hr  08/31/20 16:29  08/31/20 17:14





  Sodium Chloride  IV   100 mls





  INF PRN   Administration





  Pain   





     





     





  As Directed   


 


Ketorolac Tromethamine  30 mg  08/31/20 18:00  09/01/20 12:31





  Toradol  IVP  09/02/20 18:01  Not Given





  Q6HR [facility]   





     





     





     





     














- Exam


General Appearance: NAD, awake alert


Eye: PERRL, anicteric sclera


ENT: normocephalic atraumatic, no oropharyngeal lesions


Neck: no JVD


Heart: RRR, no gallops, no rubs, normal peripheral pulses


Heart - other findings: systolic murmur left second intercostal space 


Respiratory: CTAB, no wheezes, no rales, no ronchi


Gastrointestinal: non-distended, normal bowel sounds


Gastrointestinal - other findings: RUQ tenderness 


Extremities: no cyanosis, no clubbing, no edema


Skin: normal turgor, no lesions, no rashes


Neurological: cranial nerve grossly intact, normal sensation to touch, no focal 

deficits, no new deficit





Hosp A/P





- Plan





This is a 55 year old female admitted for cholecystectomy, found to have 

filling defect on cholangiogram, also noted to be bradycardic








#S/p laparoscopic cholecystectomy


#Possible choledocholelithiasis


- s/p lap luis 8/31. Filling defect noted on cholangiogram. ERCP done today, 

no filling defect evident


- currently on PCA for pain, consider weaning


- clear liquid diet per GI








#Bradycardia


#Systolic murmur


- sinus. Heart rate noted to decrease from 60 to 40's


- consider weaning off narcotic


- cardiology consult since appears symptomatic from this 


- ECHO ordered for murmur 








#Hyperkalemia


#Transaminitis


-  d/c potassium in IV fluids


- LFT possibly elevated from cholecystectomy vs stone, however no stone found.  

Will repeat LFTS








#Anemia


- Hb 11.8


- will monitor











Dispo: transfer to [facility], cardiology consult, ECHO

## 2020-09-01 NOTE — OP
DATE OF PROCEDURE:  09/01/2020



PROCEDURE PERFORMED:  Endoscopic retrograde cholangiopancreatography with

papillotomy. 



PREMEDICATION:  Given by Anesthesiology Department.



PREPROCEDURE DIAGNOSES:  Status post cholecystectomy, questionable intraoperative

cholangiogram for filling defect. 



POSTPROCEDURE DIAGNOSES:  

1. No obvious retained stone.

2. Dilated common bile duct, status post sphincterotomy.



DESCRIPTION OF PROCEDURE:  Written consents were obtained prior to the procedure.

After adequate sedation, the side-viewing endoscope was advanced down the stomach

through the pylorus into the duodenum.  The ampulla was visualized and appeared

grossly normal.  With great difficulty, a cannulation was achieved into the common

bile duct with assist of a guidewire.  Injection of contrast showed a uniformly

dilated common bile duct to approximately 12 mm.  No obvious filling defect was

seen.  A generous sphincterotomy was performed with good hemostasis.  A 12 to 15 mm

biliary balloon was then used to sweep the duct three times.  No obvious stone was

retrieved.  There was prompt excretion of contrast from the bile duct.  The

intrahepatic biliary tree appeared normal.  The instrument was then fully removed.

The patient tolerated the procedure well. 



ASSESSMENT:  

1. Dilated common bile duct without any obvious choledocholithiasis.

2. Status post sphincterotomy.



RECOMMENDATION:  

1. Clear liquid diet today.

2. The patient can be discharged tomorrow if everything goes well.





Job ID:  [id number]

## 2020-09-01 NOTE — CON
DATE OF CONSULTATION:  08/31/2020



REASON FOR CONSULTATION:  Choledocholithiasis seen on intraoperative cholangiogram.



CONSULTING PROVIDER:  Drew chan MD



HISTORY OF PRESENT ILLNESS:  The patient is a 55-year-old female with no significant

past medical history, who is presenting to the hospital with increased complaints of

right upper quadrant abdominal pain.  She states that she had been having

intermittent abdominal pain since approximately March 2020, characterized as a

cramping/sharp type pain would radiate to the midepigastric region and the right

shoulder, was intermittent and reaching a severity of 10/10.  This was associated

with nausea and vomiting with nonbloody emesis, subjective chills, dry mouth, and

increased eructation.  The pain was worse with bending over and pressure to the

region as well as getting better with fasting states and having a bowel movement.

Now initially, the patient was seen by her primary care physician and diagnosed with

acid reflux and given an acid reflux medication with no significant improvement in

symptoms.  With the intermittent symptoms, she learned to deal with them until more

recently.  Over the last 3 weeks she has significantly worsening of her right upper

quadrant abdominal pain, now reaching a severity of 10/10, that was fairly

unrelenting with constant nature of the pain and waxing/waning severity.

Subsequently, she underwent right laparoscopic cholecystectomy and during the

procedure, had an intraoperative cholangiogram that showed a possible filling defect

within the common bile duct.  Subsequently, she was admitted to the hospital for

further evaluation and possible ERCP tomorrow. 



At the time of the interview, the patient was in significant pain located primarily

in the right upper quadrant and extending into her right shoulder that was

intermittent in nature with the pain almost completely resolved during the course of

the interview.  However, it did also recur during the course of the interview with

the recurrence of the pain occurring within 20 minutes of resolution/improvement.

Otherwise, she denies any fevers, hematemesis, melena, hematochezia, dysphagia,

odynophagia, diarrhea, constipation, or weight loss. 



REVIEW OF SYSTEMS:  A 10-category review of systems was obtained with all responses

negative except for the pertinent positives as listed in HPI. 



PAST MEDICAL HISTORY:  None.



PAST SURGICAL HISTORY:  Laparoscopic cholecystectomy earlier today, gastric sleeve

surgery in December 2017, and lap band surgery. 



FAMILY HISTORY:  Denies any GI malignancies.



SOCIAL HISTORY:  Denies any tobacco, alcohol, or illicit drug use.



OUTPATIENT MEDICATIONS:  Reviewed.



ALLERGIES:  NO KNOWN DRUG ALLERGIES.



PHYSICAL EXAMINATION:

VITAL SIGNS:  Temperature 97.6, pulse 62, blood pressure 144/84, respiratory rate

18, and saturating 92% on 2 L nasal cannula. 

GENERAL:  The patient was lying or sitting at bedside, in moderate-to-severe

distress.  Alert and oriented x4.  Somewhat distractible during the course of her

pain as well. 

HEENT:  Normocephalic and atraumatic. 

NECK:  Supple.  No JVD or scleral icterus noted. 

CARDIOVASCULAR:  Regular rate and rhythm with no discernible murmurs, gallops, or

rubs. 

RESPIRATORY:  Clear to auscultation bilaterally with no discernible wheezes or rales

abdomen normoactive bowel sounds.  Soft and nondistended.  Tenderness to palpation

in the right upper quadrant and midepigastric regions. 

EXTREMITIES:  No cyanosis, clubbing, or edema.



LABORATORY DATA:  Labs obtained on August 27 showed a CBC with a white blood cell

count of 5, hemoglobin 12.3, hematocrit 38.3, and platelets 176.  Chemistry with a

sodium of 142, potassium 4.3, chloride 108, CO2 of 29, BUN 11, creatinine 0.82,

glucose 81, AST 18, ALT 8, alkaline phosphatase 79, total bilirubin 0.4, and albumin

4.1. 



IMAGING DATA:  The patient underwent laparoscopic cholecystectomy earlier today with

an intraoperative cholangiogram performed during the procedure, showing a possible

distant common bile duct filling defect with lack of passage of contrast into the

duodenum concerning for choledocholithiasis. 



ASSESSMENT AND PLAN:  The patient is a 55-year-old female with no significant past

medical history, presenting with right upper quadrant abdominal pain and

choledocholithiasis on intraoperative cholangiogram. 



Choledocholithiasis.  The patient is presenting with a history of intermittent right

upper quadrant abdominal pain that has been present since March 2020 and

characterized as a cramping/sharp type pain reaching a severity of 10/10.  During

the course of the workup, she was evaluated by the General Surgery Service (Dr. Drew Chan) with recommendations to proceed with a laparoscopic cholecystectomy.  During

the cholecystectomy earlier today, an intraoperative cholangiogram was performed

showing the presence of a possible filling defect within the distal common bile

duct.  At this time, her most recent LFTs do not show an obstructive pattern, which

normally should be present with the presence of choledocholithiasis, but given the

positive finding on the intraoperative cholangiogram, further

evaluation/instrumentation is indicated at this time.  Recommendations; 

1. Would place the patient on a clear liquid diet today and make her n.p.o. at

midnight in anticipation of endoscopic retrograde cholangiopancreatography tomorrow. 

2. We will perform endoscopic retrograde cholangiopancreatography tomorrow for

extraction of the biliary stone. 

3. Agree with placing the patient on antibiotics that covers both Gram negatives and

anaerobes. 

4. Pain control per primary team.

5. We will continue to trend the patient's LFTs during the course of this admission. 



We will continue to follow.  Please call with any questions.







Job ID:  [id number]

## 2020-09-02 VITALS — TEMPERATURE: 97.1 F | SYSTOLIC BLOOD PRESSURE: 131 MMHG | DIASTOLIC BLOOD PRESSURE: 61 MMHG

## 2020-09-02 LAB
ALBUMIN SERPL BCG-MCNC: 3.3 G/DL (ref 3.5–5)
ALP SERPL-CCNC: 125 U/L (ref 40–110)
ALT SERPL W P-5'-P-CCNC: 188 U/L (ref 8–55)
ANION GAP SERPL CALC-SCNC: 11 MMOL/L (ref 10–20)
AST SERPL-CCNC: 137 U/L (ref 5–34)
BILIRUB SERPL-MCNC: 1.5 MG/DL (ref 0.2–1.2)
BUN SERPL-MCNC: 8 MG/DL (ref 9.8–20.1)
CALCIUM SERPL-MCNC: 8.5 MG/DL (ref 7.8–10.44)
CHLORIDE SERPL-SCNC: 105 MMOL/L (ref 98–107)
CO2 SERPL-SCNC: 27 MMOL/L (ref 22–29)
CREAT CL PREDICTED SERPL C-G-VRATE: 94 ML/MIN (ref 70–130)
GLOBULIN SER CALC-MCNC: 2.2 G/DL (ref 2.4–3.5)
GLUCOSE SERPL-MCNC: 84 MG/DL (ref 70–105)
HGB BLD-MCNC: 10.6 G/DL (ref 12–16)
MCH RBC QN AUTO: 31.4 PG (ref 27–31)
MCV RBC AUTO: 96.6 FL (ref 78–98)
PLATELET # BLD AUTO: 105 THOU/UL (ref 130–400)
POTASSIUM SERPL-SCNC: 4.2 MMOL/L (ref 3.5–5.1)
RBC # BLD AUTO: 3.36 MILL/UL (ref 4.2–5.4)
SODIUM SERPL-SCNC: 139 MMOL/L (ref 136–145)
WBC # BLD AUTO: 6.8 THOU/UL (ref 4.8–10.8)

## 2020-09-02 RX ADMIN — CEFOXITIN SODIUM SCH MLS: 2 INJECTION, SOLUTION INTRAVENOUS at 08:51

## 2020-09-02 RX ADMIN — FAMOTIDINE SCH: 10 INJECTION, SOLUTION INTRAVENOUS at 08:52

## 2020-09-02 RX ADMIN — CEFOXITIN SODIUM SCH MLS: 2 INJECTION, SOLUTION INTRAVENOUS at 01:10

## 2020-09-02 NOTE — DIS
DATE OF ADMISSION:  08/31/2020



DATE OF DISCHARGE:  09/02/2020



DISCHARGE DIAGNOSES:  

1. Status post elective cholecystectomy with possible choledocholithiasis.

2. Transaminitis.

3. Anemia.

4. Hyperkalemia.

5. Bradycardia.



CONSULTATIONS:  Dr. Troy Pedersen with GI.



PROCEDURES:  Cholecystectomy on 08/31 and ERCP on 09/01 with papillotomy, status

post sphincterotomy. 



BRIEF HISTORY OF PRESENT ILLNESS:  This is a 55-year-old female, who was 
admitted to

the hospital for scheduled laparoscopic cholecystectomy with cholangiogram.  The

patient reported a few-month history of right upper quadrant abdominal pain with

nausea.  During the procedure, the patient had sinus bradycardia on her EKG and 
was

shaking.  She was admitted to the medical floor for further monitoring. 



HOSPITAL COURSE:  

Status post cholecystectomy:  The patient underwent a

cholecystectomy on 08/31.   She was transferred to telemetry floor.  The 
patient tolerated her cholecystectomy well.  She was advanced

to regular diet and tolerated this at the time of discharge.  She will follow up

with Dr. Chan in the clinic within a week.  The patient was discharged with

tramadol 50 mg daily p.r.n. for 3 days, and she was requiring morphine PCA 
while in

the hospital.  I checked [place]  and the patient has not filled any narcotic

prescriptions in the past two years.  She was sent home with cefdinir for 
antibiotic prophylaxis for an additional 5 days. 



Bradycardia:  The patient had a Code [place] called due to chest pain and

bradycardia after the procedure.  Heart ranged from 47 to 64. She reported 
dizziness and weakness for months. She was transferred to telemetry and heart 
rate remained stable in the 50's and she was asymptomatic at the time of 
discharge. Dr. Wright recommended follow up in his clinic and evaluation of 
holter monitor at that time. 



Possible choledocholithiasis:  During the patient's cholecystectomy, the 
patient had

an intraoperative cholangiogram, which showed possible filling defect.  GI was

consulted, and the patient underwent a sphincterotomy and was found to have a

dilated common bile duct.  There was no stone found indicating she may have 
passed it.  She was discharged on cefdinir

prophylactically for antibiotics for additional 5 days. 





Transaminitis:  The patient presented with an AST of 232, which increased to 
362 on

09/01.  After her sphincterotomy, her LFTs down trended and came down to 137, 
ALT

188, alkaline phosphatase 125.  She has no abdominal pain at the time of 
discharge.

She will follow up with Dr. Pedersen in the clinic in 1 week. 





Hyperkalemia:  The patient had a potassium of 5.2 while in the hospital.  Serial

BMPs were trended.  Her potassium came down to 4.2. 



DISCHARGE PHYSICAL EXAMINATION: 

 VITAL SIGNS:  Temperature 97.1, heart rate 64,

respiratory rate 20, O2 saturation 95% on room air, and blood pressure 131/61. 

GENERAL:  The patient is alert, awake, and oriented x3. 

CVS:  Regular rate and rhythm with no murmurs, rubs, or gallops. 

LUNGS:  Clear to auscultation bilaterally. 

ABDOMEN:  Positive bowel sounds, soft, nontender, nondistended.  Her incisions 
look

clean with no signs of infection. 

EXTREMITIES:  No edema.



PERTINENT LABORATORY DATA:

  CBC on 09/02:  White count 6.8, hemoglobin 10.6,

hematocrit 32.5, and platelet count 105. 

BMP on 09/02:  Normal. 

LFTs on 09/02:  , , alkaline phosphatase 125.



IMAGING: 

 Intraoperative cholangiogram on 08/31:  Shows possible distal common bile

duct filling with lack of passage of the contrast into the duodenum. 

Chest x-ray on 08/31:  Shows no acute cardiopulmonary process. 

Abdominal x-ray on 08/31:  Nonspecific bowel gas pattern.  Possible iatrogenic 
free

air. 

ERCP x-ray on 09/01:  Shows previous filling defect in the common bile duct is

resolved. 



DISCHARGE CONDITION:  Stable.



ACTIVITY:  As tolerated.



DIET:  Low-fat diet.



DISCHARGE MEDICATIONS:  

1. Zofran 4 mg p.o. q.4 hours p.r.n.

2. Cefdinir 300 mg p.o. q.12 hours for 5 days.

3. Tramadol 50 mg p.o. q.6 hours p.r.n.  Written script was provided.



DISCHARGE INSTRUCTIONS:  The patient should follow up with her PCP in a week.  
She will

also follow up with Dr. East in a week and have repeat LFTs in a week.  She will

also follow up with Dr. Chan in 1 to 2 weeks.  Also follow up with Dr. Wright
,

who may consider placing a Holter monitor on you for further evaluation of

bradycardia. 







Job ID:  [id number]



[facility]

## 2020-09-03 NOTE — DIS
DATE OF ADMISSION:  08/31/2020



DATE OF DISCHARGE:  09/02/2020



DISCHARGE DIAGNOSES:  Symptomatic cholelithiasis, choledocholithiasis, postoperative

bradycardia. 



HOSPITAL COURSE:  The patient was admitted, taken to the operating room, where she

underwent laparoscopic cholecystectomy with cholangiogram.  Intraoperatively, the

cholangiogram showed a filling defect and no flow into the duodenum.

Postoperatively, GI was consulted.  She underwent ERCP with sphincterotomy.  Post

procedure, she got much better.  The pain was relieved.  Her LFTs came down.  She is

now tolerating diet and pain is controlled on p.o. medications.  She is discharged

home on hydrocodone and Zofran.  She will follow up with me in 2 weeks. 







Job ID:  [id number]